# Patient Record
(demographics unavailable — no encounter records)

---

## 2024-10-30 NOTE — HEALTH RISK ASSESSMENT
[Very Good] : ~his/her~  mood as very good [No] : In the past 12 months have you used drugs other than those required for medical reasons? No [No falls in past year] : Patient reported no falls in the past year [Little interest or pleasure doing things] : 1) Little interest or pleasure doing things [Feeling down, depressed, or hopeless] : 2) Feeling down, depressed, or hopeless [0] : 2) Feeling down, depressed, or hopeless: Not at all (0) [PHQ-2 Negative - No further assessment needed] : PHQ-2 Negative - No further assessment needed [Audit-CScore] : 0 [de-identified] : active with children [[id number]] : 0 [Never] : Never [NO] : No [Patient reported mammogram was normal] : Patient reported mammogram was normal [Patient reported PAP Smear was normal] : Patient reported PAP Smear was normal [None] : None [With Family] : lives with family [Employed] : employed [Graduate School] : graduate school [] :  [Sexually Active] : sexually active [High Risk Behavior] : no high risk behavior [Feels Safe at Home] : Feels safe at home [Fully functional (bathing, dressing, toileting, transferring, walking, feeding)] : Fully functional (bathing, dressing, toileting, transferring, walking, feeding) [Fully functional (using the telephone, shopping, preparing meals, housekeeping, doing laundry, using] : Fully functional and needs no help or supervision to perform IADLs (using the telephone, shopping, preparing meals, housekeeping, doing laundry, using transportation, managing medications and managing finances) [Reports changes in hearing] : Reports no changes in hearing [Reports changes in vision] : Reports no changes in vision [Smoke Detector] : smoke detector [Carbon Monoxide Detector] : carbon monoxide detector [Seat Belt] :  uses seat belt [Sunscreen] : uses sunscreen [MammogramDate] : 07/24 [PapSmearDate] : 07/24 [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

## 2024-10-30 NOTE — PHYSICAL EXAM
[___/1] : [unfilled]/1   [___/3] : [unfilled]/3 [___/5] : [unfilled]/5 [___/4] : [unfilled]/4 [___/2] : [unfilled]/2 [Normal] : Normal [TextBox_4] : Grad [TextBox_2] : Yes [SlumsTotal] : 26

## 2024-10-30 NOTE — PHYSICAL EXAM
[___/1] : [unfilled]/1   [___/3] : [unfilled]/3 [___/5] : [unfilled]/5 [___/4] : [unfilled]/4 [___/2] : [unfilled]/2 [Normal] : Normal [TextBox_2] : Yes [TextBox_4] : Grad [SlumsTotal] : 26

## 2024-10-30 NOTE — HEALTH RISK ASSESSMENT
[Very Good] : ~his/her~  mood as very good [No] : In the past 12 months have you used drugs other than those required for medical reasons? No [No falls in past year] : Patient reported no falls in the past year [Little interest or pleasure doing things] : 1) Little interest or pleasure doing things [Feeling down, depressed, or hopeless] : 2) Feeling down, depressed, or hopeless [0] : 2) Feeling down, depressed, or hopeless: Not at all (0) [PHQ-2 Negative - No further assessment needed] : PHQ-2 Negative - No further assessment needed [Audit-CScore] : 0 [de-identified] : active with children [[id number]] : 0 [Never] : Never [NO] : No [Patient reported mammogram was normal] : Patient reported mammogram was normal [Patient reported PAP Smear was normal] : Patient reported PAP Smear was normal [None] : None [With Family] : lives with family [Employed] : employed [Graduate School] : graduate school [] :  [Sexually Active] : sexually active [High Risk Behavior] : no high risk behavior [Feels Safe at Home] : Feels safe at home [Fully functional (bathing, dressing, toileting, transferring, walking, feeding)] : Fully functional (bathing, dressing, toileting, transferring, walking, feeding) [Fully functional (using the telephone, shopping, preparing meals, housekeeping, doing laundry, using] : Fully functional and needs no help or supervision to perform IADLs (using the telephone, shopping, preparing meals, housekeeping, doing laundry, using transportation, managing medications and managing finances) [Reports changes in hearing] : Reports no changes in hearing [Reports changes in vision] : Reports no changes in vision [Smoke Detector] : smoke detector [Carbon Monoxide Detector] : carbon monoxide detector [Seat Belt] :  uses seat belt [Sunscreen] : uses sunscreen [MammogramDate] : 07/24 [PapSmearDate] : 07/24 [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

## 2024-10-30 NOTE — HISTORY OF PRESENT ILLNESS
[de-identified] : HCM Pap  Mammo/sono   Ob- Dr Danita Nina Elevated BP with last pregnancy 130/80, , induced  Son- Fan 2yo 15, 13 yo Breast feed 2 months  Sleep problems/fatigue 12:30am up at 6am nap during day denies depression 2 yo son is in the bed

## 2024-10-30 NOTE — HISTORY OF PRESENT ILLNESS
[de-identified] : HCM Pap  Mammo/sono   Ob- Dr Danita Nina Elevated BP with last pregnancy 130/80, , induced  Son- Fan 2yo 15, 11 yo Breast feed 2 months  Sleep problems/fatigue 12:30am up at 6am nap during day denies depression 2 yo son is in the bed

## 2024-10-30 NOTE — PLAN
[FreeTextEntry1] : Up to date on age-appropriate screening  update dtap today consider seasonal vaccinations  Get pregnancy records- sounds like HTN in pregnancy Follow BPs Cardiac risk factors- DM assessment  Memory loss- nl exam, focus on sleep hygiene

## 2025-06-26 NOTE — PLAN
[FreeTextEntry1] : Anemia- CBC and iron levels better with supplementation. Continue iron and reduce freq. Follow menses  Fatigue and memory loss- Suspect multi-factorial. Follow, wants to wait on neuro referral or further testing

## 2025-06-26 NOTE — HISTORY OF PRESENT ILLNESS
[de-identified] : Follow-up on sleep hygiene, memory and anemia  Toddler continues to sleep in bedroom with parents. crib more often, but still back in bed on certain nights Still forgetful, loses cell phone often, forgets small tasks. No sig memory lapses or impact on QOL 1-2 cups of coffee  Anemia- Menses every 28 dys, 2-3 dys heavy, tampons and pads, changes every 3-4 yrs, no pain BK- skipped LN- Chinese  DR- chicken and salad Red meat 2-3 a week Arugula, brocoli- greens every other day Iron supplement 1 tab daily

## 2025-06-26 NOTE — PHYSICAL EXAM
[No Acute Distress] : no acute distress [EOMI] : extraocular movements intact [Supple] : supple [No Respiratory Distress] : no respiratory distress  [No Accessory Muscle Use] : no accessory muscle use [Clear to Auscultation] : lungs were clear to auscultation bilaterally [Normal Rate] : normal rate  [Regular Rhythm] : with a regular rhythm [Normal S1, S2] : normal S1 and S2 [No Edema] : there was no peripheral edema [Soft] : abdomen soft [de-identified] : declined MME